# Patient Record
Sex: MALE | URBAN - METROPOLITAN AREA
[De-identification: names, ages, dates, MRNs, and addresses within clinical notes are randomized per-mention and may not be internally consistent; named-entity substitution may affect disease eponyms.]

---

## 2019-11-06 ENCOUNTER — TELEPHONE (OUTPATIENT)
Dept: TRANSPLANT | Facility: CLINIC | Age: 54
End: 2019-11-06

## 2019-11-06 NOTE — TELEPHONE ENCOUNTER
Bismark Aguirre called and stated that he is interested in becoming a living donor for his cousin, Amanuel Ventura.  Medical and social history obtained. No contraindications noted.  All questions answered.  Kidney donor information book already received from his sister and reviewed. Patient would like to proceed with testing. HLA kit requested.

## 2019-12-11 PROCEDURE — 81372 HLA I TYPING COMPLETE LR: CPT | Mod: PO,TXP

## 2019-12-11 PROCEDURE — 81375 HLA II TYPING AG EQUIV LR: CPT | Mod: PO,TXP

## 2019-12-12 ENCOUNTER — TELEPHONE (OUTPATIENT)
Dept: TRANSPLANT | Facility: CLINIC | Age: 54
End: 2019-12-12

## 2019-12-12 ENCOUNTER — LAB VISIT (OUTPATIENT)
Dept: LAB | Facility: HOSPITAL | Age: 54
End: 2019-12-12
Payer: COMMERCIAL

## 2019-12-12 DIAGNOSIS — Z00.5 TRANSPLANT DONOR EVALUATION: Primary | ICD-10-CM

## 2019-12-12 DIAGNOSIS — Z00.5 TRANSPLANT DONOR EVALUATION: ICD-10-CM

## 2019-12-12 PROCEDURE — 86900 BLOOD TYPING SEROLOGIC ABO: CPT | Mod: TXP

## 2019-12-12 PROCEDURE — 86850 RBC ANTIBODY SCREEN: CPT | Mod: TXP

## 2019-12-12 PROCEDURE — 86901 BLOOD TYPING SEROLOGIC RH(D): CPT | Mod: TXP

## 2019-12-12 PROCEDURE — 36415 COLL VENOUS BLD VENIPUNCTURE: CPT | Mod: TXP

## 2019-12-13 LAB
ABO GROUP BLD: NORMAL
BLD GP AB SCN CELLS X3 SERPL QL: NORMAL
RH BLD: NORMAL

## 2019-12-20 LAB — HLATY INTERPRETATION: NORMAL

## 2019-12-23 LAB
DNA1Q TESTING DATE: NORMAL
DNA2Q TESTING DATE: NORMAL
HLA DQA1 1: NORMAL
HLA DQA1 2: NORMAL
HLA DRB4 1: NORMAL
HLA-A 1 SERO. EQUIV: 2
HLA-A 1: NORMAL
HLA-A 2 SERO. EQUIV: 11
HLA-A 2: NORMAL
HLA-B 1 SERO. EQUIV: 35
HLA-B 1: NORMAL
HLA-B 2 SERO. EQUIV: 52
HLA-B 2: NORMAL
HLA-BW 1 SERO. EQUIV: 6
HLA-BW 2 SERO. EQUIV: 4
HLA-C 1: NORMAL
HLA-C 2: NORMAL
HLA-CW 1 SERO. EQUIV: 4
HLA-CW 2 SERO. EQUIV: 12
HLA-DP 1 SERO. EQUIV: NORMAL
HLA-DP 2 SERO. EQUIV: NORMAL
HLA-DPA1 1: NORMAL
HLA-DPA1 2: NORMAL
HLA-DPB1 1: NORMAL
HLA-DPB1 2: NORMAL
HLA-DQ 1 SERO. EQUIV: 7
HLA-DQ 2 SERO. EQUIV: 5
HLA-DQB1 1: NORMAL
HLA-DQB1 2: NORMAL
HLA-DRB1 1 SERO. EQUIV: 103
HLA-DRB1 1: NORMAL
HLA-DRB1 2 SERO. EQUIV: 11
HLA-DRB1 2: NORMAL
HLA-DRB3 1: NORMAL
HLA-DRB3 2: NORMAL
HLA-DRB345 1 SERO. EQUIV: 52
HLA-DRB345 2 SERO. EQUIV: NORMAL
HLA-DRB4 2: NORMAL
HLA-DRB5 1: NORMAL
HLA-DRB5 2: NORMAL
LDNA1 TESTING DATE: NORMAL
LDNA2 TESTING DATE: NORMAL

## 2020-02-06 ENCOUNTER — TELEPHONE (OUTPATIENT)
Dept: TRANSPLANT | Facility: CLINIC | Age: 55
End: 2020-02-06

## 2020-07-15 ENCOUNTER — DOCUMENTATION ONLY (OUTPATIENT)
Dept: TRANSPLANT | Facility: CLINIC | Age: 55
End: 2020-07-15

## 2020-07-15 NOTE — NURSING
Declined for kidney donation. Unable to contact patient with test results and no contact from patient.